# Patient Record
(demographics unavailable — no encounter records)

---

## 2024-11-01 NOTE — ASSESSMENT
[FreeTextEntry1] : 79 yo Male w/ Hx of controlled Type 2 DM, HTN, HLD, BPH, prostate cancer (s/p RT, completed 5/2023), IPMN w/ focal high-grade dysplasia (mixed main and branch duct type, mixed oncocytic, pyloric and biliary type) s/p Whipple with Delight band procedure with appendectomy for intraoperatively noted malrotation (8/11/23, negative margin, reactive LNs), followed by surgical oncology (Dr. Fan) and medical oncology (Dr. Velasquez), and multiple myeloma (Dx 2023), was referred by Dr. Velasquez for hepatitis B. He was started on entecavir by Dr. Velasquez  mid August 2024.  Will obtain:  Hepatitis B, C and D serologies, HBV VL, HIV Hepatitis A immunity Doppler of hepatic and portal veins MRI abd w/wo if no contraindication b/o abnormal enhancement on CT  AFP, CEA, CA 19-9 Will try to get records from Dr. Velasquez C/w entecavir for now. Will try to see if insurance would approve Vemlidy (after above tests).  Patient has been counseled on prevention of HBV transmission, including but not limited to: not sharing toothbrushes, razors, injection equipment, glucose testing equipment, covering open cuts and scratches, using barrier protection (if sexual partner not immune), testing household and sexual contacts and vaccinating if not immune, not donating blood, organs or sperm.  Wife reportedly negative for Hep B, but uncertain if immune. Their daughter lives in same household.

## 2024-11-01 NOTE — PHYSICAL EXAM
[Scleral Icterus] : No Scleral Icterus [Spider Angioma] : No spider angioma(s) were observed [Abdominal  Ascites] : no ascites [Non-Tender] : non-tender [Asterixis] : no asterixis observed [Jaundice] : No jaundice [Palmar Erythema] : no Palmar Erythema [FreeTextEntry4] : Limited exam due to obesity [General Appearance - Alert] : alert [General Appearance - In No Acute Distress] : in no acute distress [General Appearance - Well Nourished] : well nourished [General Appearance - Well Developed] : well developed [Sclera] : the sclera and conjunctiva were normal [Oropharynx] : the oropharynx was normal [Neck Appearance] : the appearance of the neck was normal [Auscultation Breath Sounds / Voice Sounds] : lungs were clear to auscultation bilaterally [Heart Rate And Rhythm] : heart rate was normal and rhythm regular [Bowel Sounds] : normal bowel sounds [Abdomen Soft] : soft [Abdomen Tenderness] : non-tender [] : no hepato-splenomegaly [Abdomen Mass (___ Cm)] : no abdominal mass palpated [Cervical Lymph Nodes Enlarged Posterior Bilaterally] : posterior cervical [Cervical Lymph Nodes Enlarged Anterior Bilaterally] : anterior cervical [Supraclavicular Lymph Nodes Enlarged Bilaterally] : supraclavicular [Axillary Lymph Nodes Enlarged Bilaterally] : axillary [Femoral Lymph Nodes Enlarged Bilaterally] : femoral [Inguinal Lymph Nodes Enlarged Bilaterally] : inguinal [No CVA Tenderness] : no ~M costovertebral angle tenderness [No Spinal Tenderness] : no spinal tenderness [Skin Color & Pigmentation] : normal skin color and pigmentation [FreeTextEntry1] : Grossly intact [Oriented To Time, Place, And Person] : oriented to person, place, and time [Impaired Insight] : insight and judgment were intact [Affect] : the affect was normal

## 2024-11-01 NOTE — REVIEW OF SYSTEMS
[Fever] : no fever [Chills] : no chills [Recent Weight Gain (___ Lbs)] : recent [unfilled] ~Ulb weight gain [Shortness Of Breath] : no shortness of breath [Cough] : no cough [SOB on Exertion] : no shortness of breath during exertion [Abdominal Pain] : no abdominal pain [Vomiting] : no vomiting [Constipation] : constipation [Diarrhea] : no diarrhea [Dysuria] : no dysuria [Itching] : no itching [Confused] : no confusion [Negative] : Heme/Lymph [FreeTextEntry2] : 10 lb gain last few months [FreeTextEntry7] : No nausea, occasional hematochezia (colonoscopy Dr. Eli, s/p hemorrhoid surgery Dr. Donnelly)

## 2024-11-01 NOTE — HISTORY OF PRESENT ILLNESS
[Needlestick Exposure] : no needlestick exposure [Infected Sexual Partner] : no infected sexual partner [IV Drug Use] : no IV drug use [Tattoo] : no tattoos [Body Piercing] : no body piercing [Hemodialysis] : no hemodialysis [Transfusion before 1992] : no transfusion before 1992 [Transplant before 1992] : no transplant before 1992 [Incarceration] : no incarceration [Alcohol Abuse] : no alcohol abuse [Autoimmune Disorder] : no autoimmune disorder [Household Contact to HBV] : no household contact to HBV [Travel to Endemic Area] : no travel to an endemic area [Occupational Exposure] : no occupational exposure [Cocaine Use] : no cocaine use [de-identified] : Born in [place]; Worked as technician;  [FreeTextEntry1] : 80 yo Male w/ Hx of controlled Type 2 DM, HTN, HLD, BPH, prostate cancer (s/p RT, completed 5/2023), IPMN w/ focal high-grade dysplasia (mixed main and branch duct type, mixed oncocytic, pyloric and biliary type) s/p Whipple with Guanaco band procedure with appendectomy for intraoperatively noted malrotation (8/11/23, negative margin, reactive LNs), followed by surgical oncology (Dr. Fan) and medical oncology (Dr. Velasquez), and multiple myeloma (Dx 2023), was referred by Dr. Velasquez for hepatitis B.  CT a/p w/ iv 8/9/24 showed ill-defined R hepatic lobe enhancement with wash out on venous phase without discrete mass, thought to be perfusional change, trace ascites, no recurrent pancreatic mass or metastasis, interval decrease in pancreatic duct dilation (0.8 -> 0.4 cm), dislodged pancreatic duct stent and a 1 cm renal lesion.  Of note, he also had recent COVID-19 (7/2024).   He was seen for initial evaluation 9/3/24, accompanied by wife.  No Hepatitis B labs in chart.  Per patient he was Dx with Hepatitis B at the time of his carpal tunnel surgery (4/2024), patient was not aware before. He does not have obvious risk factors for viral hepatitis. He had shoulder surgery, and fertility surgery in his young adulthood. He was started on entecavir by Dr. Velasquez  mid August 2024.  Labs 3/2024 showed: WBC 4, Hb 10.1, , Na 140, K 4.4, BUN 17, Cr 0.8, AST 21, ALT 20, ALP 70, total bili 0.2, alb 3.8, INR 0.98. Currently he is taking: Metformin 500 mg bid, Valsartan /HCTZ 320/25 mg, CArvedilol 25 mg bid, Atorvastatin 10 mg, Aspirin 81 mg, Tamsulosin 0.4 mg bid, Lupron injection (?), Eliquis 2.5 mg bid, Valacyclovir 500 mg, Velcade. Revliomid, Dexamethasone, iron, and entecavir 0.5 mg.   He is here for follow up. Labs 9/13/24 showed normal liver enzymes and function, ALT 31, past Hep B infection (HBsAg neg, HBcAb pos, HBsAb pos, HBV PCR neg), neg Hep C ab, Hep A immuity. noted mild pancytopenia (has MM).  US abd 9/23/24 showed few lineat foci that was reported as possible sign of pneumobilia, no biliary dilation, s/p cholecystectomy, and patent hepatic vasculature.  Interim he was seen by surgical oncology, Dr. Mchugh, and as per discussion, no need for MRI for now, prior imaging was reviewed, and he will do CT pancreas protocol 3/2025.

## 2024-11-01 NOTE — CONSULT LETTER
[Dear  ___] : Dear  [unfilled], [Consult Letter:] : I had the pleasure of evaluating your patient, [unfilled]. [( Thank you for referring [unfilled] for consultation for _____ )] : Thank you for referring [unfilled] for consultation for [unfilled] [Please see my note below.] : Please see my note below. [Consult Closing:] : Thank you very much for allowing me to participate in the care of this patient.  If you have any questions, please do not hesitate to contact me. [FreeTextEntry2] : Dr. Velasquez

## 2025-03-17 NOTE — ASSESSMENT
[FreeTextEntry1] : IMP: 80 year old male present with IPMN with dilated duct and nodules, high risk features- suspicious for main duct component of IPMN s/p whipple on 8/11/2023. Path showed high grade dysplasia with negative margins.   CT abd - 2/2024- TIFFANIE  CT abd- 08/2024- TIFFANIE  CT AP (NW) 03/2025-  1. No evidence of metastatic disease in the abdomen or pelvis. 2. Enlarging lesion in the superior pole the right kidney with possible internal enhancement. This lesion now measures 1.9 cm, previously 1 cm on prior from 8/9/2024. Recommend renal protocol MRI with and without contrast to evaluate for possible renal neoplasm.  PLAN:  -Referred to urology for evaluation of 1.9cm right kidney lesion - RTO in 6 months after imaging.  I have discussed the diagnosis, therapeutic plan and options with the patient at length. Patient expressed verbal understanding to proceed with proposed plan. All questions answered.

## 2025-03-17 NOTE — PHYSICAL EXAM
[FreeTextEntry1] :   COVID -19 precautions as per [facility] policy was universally followed.

## 2025-03-17 NOTE — CONSULT LETTER
Well-Child Checkup: 18 Months     Put latches on cabinet doors to help keep your child safe. At the 18-month checkup, your healthcare provider will [address] child and ask how it’s going at home. This sheet describes some of what you can expect. [Dear  ___] : Dear  [unfilled], · Your child should drink less of whole milk each day. Most calories should be from solid foods. · Besides drinking milk, water is best. Limit fruit juice. It should be 100% juice. You can also add water to the juice. And, don’t give your toddler soda.   · [Courtesy Letter:] : I had the pleasure of seeing your patient, [unfilled], in my office today. · Protect your toddler from falls with sturdy screens on windows and omrton at the tops and bottoms of staircases. Supervise the child on the stairs. · If you have a swimming pool, it should be fenced.  [place] or doors leading to the pool should be closed an [( Thank you for referring [unfilled] for consultation for _____ )] : Thank you for referring [unfilled] for consultation for [unfilled] · Your child will become more independent and more stubborn. It’s common to test limits, to see just how much he or she can get away with. You may hear the word “no” a lot— even when the child seems to mean yes! Be clear and consistent.  Keep in mind that y Next checkup at: __________24mo_____________________     PARENT NOTES:  Date Last Reviewed: 10/1/2014  © 2613-9897 [address], [address] [place] [address]. All rights reserved.  This information is not intended as a substitute f [Please see my note below.] : Please see my note below. Never give more than 4 doses in a 24 hour period  Please note the difference in the strengths between infant and children's ibuprofen  Do not give ibuprofen to children under 10months of age unless advised by your doctor    Infant Concentrated drops = 50 m [Consult Closing:] : Thank you very much for allowing me to participate in the care of this patient.  If you have any questions, please do not hesitate to contact me. - Children 6 years and older it is recommended to place consistent limits on hours per day of media use. It is important to make certain that children get enough sleep at night and exercise daily.  - Help children select appropriate media.   Talk about saf [Sincerely,] : Sincerely, [FreeTextEntry2] : Watson Newton  [FreeTextEntry3] : Vinh Fan MD, XIANG, FACS Director of Surgical Oncology- [facility] , Department of Surgery [facility] at [place] [id number] Ph: [phone] Cell: [phone]  [DrMohsen ___] : Dr. MARTEL

## 2025-03-17 NOTE — REASON FOR VISIT
[Follow-Up Visit] : a follow-up visit for [FreeTextEntry2] : IPMN with HGD on surveillance s/p whipple 08/2023

## 2025-03-17 NOTE — HISTORY OF PRESENT ILLNESS
[de-identified] : Mr. TOM MOREL is a 80 year old male presenting with IPMN s/p Whipple on 8/11/2023 here for follow up visit. Referred by Dr. Newton.  PMH: prostate cancer s/p radiation, DM2, HTN, HLD, BPH.  Family history: Denies   CT 12/2022: 4.8 x 5.5 cm mass in the head.   EGD/EUS 5/24/23: 5.8 cm pancreatic head cystic lesion with mild upstream ductal dilation, possible IPMN vs serous cystadenoma.  FNA- negative for malignant cells.   CT abd/pelvis 6/19/23: Multiseptated cystic lesion in the pancreatic head measuring 6.7 cm; differential includes an intraductal papillary mucinous neoplasm or serous cystadenoma; further evaluation is recommended with a pre and post IV contrast MRI of the abdomen.Dilated main pancreatic duct upstream from the mass measuring 5 mm.1.1 cm lesion in the upper pole the right kidney, incompletely characterized on this study; further evaluation is also recommended with an MRI  7/26/2023: Completed treatment for prostate cancer 2 months ago. Stress test (2 weeks ago) positive and was recommended cardiac cath.   **surgery** s/p whipple on 8/11/2023.  1. Gallbladder: Mild chronic cholecystitis. 2. Product of Whipple: Intraductal papillary mucinous neoplasm (IPMN) with focal high-grade dysplasia, mixed main and branch duct types, mixed oncocytic, pyloric, and biliary type epithelium. Low-grade PanIN. Margins of resection are negative for tumor. Fourteen reactive lymph nodes, including occasional epithelioid granulomas, non-necrotizing. 3. Gaunaco's band: Benign fibromembranous tissue and adipose tissue. 4. Appendix: benign appendix with fibrous obliteration. AFB and GMS stains negative  8/22/2023 C DIFF negative  8/23/2023: Postop, patient states diarrhea has resolved and now has soft stools after using Imodium. FAM serosanguinous output ranging from  mLs. Tolerating diet.   8/29/2023: CDIFF negative  9/27/2023: Today patient is doing well, he is complaining of some gas pain. He's been taking Gas-X which provides some relief. He was started on iron PO for anemia. Complaining of some constipation, taking miralax prn. Otherwise, denies nausea, vomiting, fevers/chills.   3/20/24: Patient reports feeling well today. Denies fever, chills, N/V/D, unintentional weight loss.  CT 2/2024- TIFFANIE  CT 08/2024- TIFFANIE   Cardiologist: Dr. Korey Forbes  [phone] PCP: Dr. Eric Goldberg  Urology: Dr. Castillo Emery  GI: Dr: Markus Mujica/onc: Dr. Velasquez

## 2025-04-01 NOTE — HISTORY OF PRESENT ILLNESS
[de-identified] : Mr. TOM MOREL is a 80 year old male presenting with IPMN s/p Whipple on 8/11/2023 here for follow up visit. Referred by Dr. Newton.  PMH: prostate cancer s/p radiation, DM2, HTN, HLD, BPH.  Family history: Denies   CT 12/2022: 4.8 x 5.5 cm mass in the head.   EGD/EUS 5/24/23: 5.8 cm pancreatic head cystic lesion with mild upstream ductal dilation, possible IPMN vs serous cystadenoma.  FNA- negative for malignant cells.   CT abd/pelvis 6/19/23: Multiseptated cystic lesion in the pancreatic head measuring 6.7 cm; differential includes an intraductal papillary mucinous neoplasm or serous cystadenoma; further evaluation is recommended with a pre and post IV contrast MRI of the abdomen.Dilated main pancreatic duct upstream from the mass measuring 5 mm.1.1 cm lesion in the upper pole the right kidney, incompletely characterized on this study; further evaluation is also recommended with an MRI  7/26/2023: Completed treatment for prostate cancer 2 months ago. Stress test (2 weeks ago) positive and was recommended cardiac cath.   **surgery** s/p whipple on 8/11/2023.  1. Gallbladder: Mild chronic cholecystitis. 2. Product of Whipple: Intraductal papillary mucinous neoplasm (IPMN) with focal high-grade dysplasia, mixed main and branch duct types, mixed oncocytic, pyloric, and biliary type epithelium. Low-grade PanIN. Margins of resection are negative for tumor. Fourteen reactive lymph nodes, including occasional epithelioid granulomas, non-necrotizing. 3. Guanaco's band: Benign fibromembranous tissue and adipose tissue. 4. Appendix: benign appendix with fibrous obliteration. AFB and GMS stains negative  8/22/2023 C DIFF negative  8/23/2023: Postop, patient states diarrhea has resolved and now has soft stools after using Imodium. FAM serosanguinous output ranging from  mLs. Tolerating diet.   8/29/2023: CDIFF negative  9/27/2023: Today patient is doing well, he is complaining of some gas pain. He's been taking Gas-X which provides some relief. He was started on iron PO for anemia. Complaining of some constipation, taking miralax prn. Otherwise, denies nausea, vomiting, fevers/chills.   3/20/24: Patient reports feeling well today. Denies fever, chills, N/V/D, unintentional weight loss.  CT 2/2024- TIFFANIE  CT 08/2024- TIFFANIE  03/26/2025 Pt reports to the office today feeling well. Denies abdominal pain, n/v, diarrhea, blood in the stool, tolerating diet, and denies weight loss in the last 2 months. No generalized jaundice noted or acholic stools. Hypertensive, denies headaches, but can feel some pressure, pt advised to follow up with PCP Dr. Goldberg.   Cardiologist: Dr. Korey Forbes  [phone] PCP: Dr. Eric Goldberg  Urology: Dr. Castillo Emery  GI: Dr: Markus Mujica/onc: Dr. Velasquez

## 2025-04-01 NOTE — CONSULT LETTER
[DrMohsen  ___] : Dr. MARTEL [FreeTextEntry2] : Castillo Emery [FreeTextEntry3] : Vinh Fan MD, XIANG, FACS Director of Surgical Oncology- [facility] , Department of Surgery [facility] at [place] [id number] Ph: [phone] Cell: [phone]

## 2025-04-01 NOTE — PHYSICAL EXAM
[Normal] : supple, no neck mass and thyroid not enlarged [Normal Neck Lymph Nodes] : normal neck lymph nodes  [Normal Supraclavicular Lymph Nodes] : normal supraclavicular lymph nodes [Normal Groin Lymph Nodes] : normal groin lymph nodes [Normal Axillary Lymph Nodes] : normal axillary lymph nodes [Normal] : oriented to person, place and time, with appropriate affect [FreeTextEntry1] :   COVID -19 precautions as per [facility] policy was universally followed. [de-identified] : Abdomen soft, nontender, nondistended, no palpable masses.

## 2025-04-01 NOTE — CONSULT LETTER
[DrMohsen  ___] : Dr. MARTEL [FreeTextEntry2] : Castillo Emery [FreeTextEntry3] : Vinh Fan MD, XIANG, FACS Director of Surgical Oncology- [facility] , Department of Surgery [facility] at [place] [id number] Ph: [phone] Cell: [phone]

## 2025-04-01 NOTE — HISTORY OF PRESENT ILLNESS
[de-identified] : Mr. TOM MOREL is a 80 year old male presenting with IPMN s/p Whipple on 8/11/2023 here for follow up visit. Referred by Dr. Newton.  PMH: prostate cancer s/p radiation, DM2, HTN, HLD, BPH.  Family history: Denies   CT 12/2022: 4.8 x 5.5 cm mass in the head.   EGD/EUS 5/24/23: 5.8 cm pancreatic head cystic lesion with mild upstream ductal dilation, possible IPMN vs serous cystadenoma.  FNA- negative for malignant cells.   CT abd/pelvis 6/19/23: Multiseptated cystic lesion in the pancreatic head measuring 6.7 cm; differential includes an intraductal papillary mucinous neoplasm or serous cystadenoma; further evaluation is recommended with a pre and post IV contrast MRI of the abdomen.Dilated main pancreatic duct upstream from the mass measuring 5 mm.1.1 cm lesion in the upper pole the right kidney, incompletely characterized on this study; further evaluation is also recommended with an MRI  7/26/2023: Completed treatment for prostate cancer 2 months ago. Stress test (2 weeks ago) positive and was recommended cardiac cath.   **surgery** s/p whipple on 8/11/2023.  1. Gallbladder: Mild chronic cholecystitis. 2. Product of Whipple: Intraductal papillary mucinous neoplasm (IPMN) with focal high-grade dysplasia, mixed main and branch duct types, mixed oncocytic, pyloric, and biliary type epithelium. Low-grade PanIN. Margins of resection are negative for tumor. Fourteen reactive lymph nodes, including occasional epithelioid granulomas, non-necrotizing. 3. Guanaco's band: Benign fibromembranous tissue and adipose tissue. 4. Appendix: benign appendix with fibrous obliteration. AFB and GMS stains negative  8/22/2023 C DIFF negative  8/23/2023: Postop, patient states diarrhea has resolved and now has soft stools after using Imodium. FAM serosanguinous output ranging from  mLs. Tolerating diet.   8/29/2023: CDIFF negative  9/27/2023: Today patient is doing well, he is complaining of some gas pain. He's been taking Gas-X which provides some relief. He was started on iron PO for anemia. Complaining of some constipation, taking miralax prn. Otherwise, denies nausea, vomiting, fevers/chills.   3/20/24: Patient reports feeling well today. Denies fever, chills, N/V/D, unintentional weight loss.  CT 2/2024- TIFFANIE  CT 08/2024- TIFFANIE  03/26/2025 Pt reports to the office today feeling well. Denies abdominal pain, n/v, diarrhea, blood in the stool, tolerating diet, and denies weight loss in the last 2 months. No generalized jaundice noted or acholic stools. Hypertensive, denies headaches, but can feel some pressure, pt advised to follow up with PCP Dr. Goldberg.   Cardiologist: Dr. Korey Forbes  [phone] PCP: Dr. Eric Goldberg  Urology: Dr. Castillo Emery  GI: Dr: Markus Mujica/onc: Dr. Velasquez

## 2025-04-01 NOTE — CONSULT LETTER
[DrMohsen  ___] : Dr. MARTEL [FreeTextEntry2] : Castillo Emery [FreeTextEntry3] : Vinh Fan MD, XIANG, FACS Director of Surgical Oncology- [facility] , Department of Surgery [facility] at [place] [id number] Ph: [phone] Cell: [phone]

## 2025-04-01 NOTE — PHYSICAL EXAM
[Normal] : supple, no neck mass and thyroid not enlarged [Normal Neck Lymph Nodes] : normal neck lymph nodes  [Normal Supraclavicular Lymph Nodes] : normal supraclavicular lymph nodes [Normal Groin Lymph Nodes] : normal groin lymph nodes [Normal Axillary Lymph Nodes] : normal axillary lymph nodes [Normal] : oriented to person, place and time, with appropriate affect [FreeTextEntry1] :   COVID -19 precautions as per [facility] policy was universally followed. [de-identified] : Abdomen soft, nontender, nondistended, no palpable masses.

## 2025-04-01 NOTE — ASSESSMENT
[FreeTextEntry1] : IMP: 80 year old male present with IPMN with dilated duct and nodules, high risk features- suspicious for main duct component of IPMN s/p whipple on 8/11/2023. Path showed high grade dysplasia with negative margins.   CT abd - 2/2024- TIFFANIE  CT abd- 08/2024- TIFFANIE  CT AP (NW) 03/2025-  1. No evidence of metastatic disease in the abdomen or pelvis. 2. Enlarging lesion in the superior pole the right kidney with possible internal enhancement. This lesion now measures 1.9 cm, previously 1 cm on prior from 8/9/2024. Recommend renal protocol MRI with and without contrast to evaluate for possible renal neoplasm.  PLAN:  -Referred to urology for evaluation of 1.9cm right kidney lesion, referred to urologist  - RTO in 6 months after imaging.  I have discussed the diagnosis, therapeutic plan and options with the patient at length. Patient expressed verbal understanding to proceed with proposed plan. All questions answered.

## 2025-04-01 NOTE — HISTORY OF PRESENT ILLNESS
[de-identified] : Mr. TOM MOREL is a 80 year old male presenting with IPMN s/p Whipple on 8/11/2023 here for follow up visit. Referred by Dr. Newton.  PMH: prostate cancer s/p radiation, DM2, HTN, HLD, BPH.  Family history: Denies   CT 12/2022: 4.8 x 5.5 cm mass in the head.   EGD/EUS 5/24/23: 5.8 cm pancreatic head cystic lesion with mild upstream ductal dilation, possible IPMN vs serous cystadenoma.  FNA- negative for malignant cells.   CT abd/pelvis 6/19/23: Multiseptated cystic lesion in the pancreatic head measuring 6.7 cm; differential includes an intraductal papillary mucinous neoplasm or serous cystadenoma; further evaluation is recommended with a pre and post IV contrast MRI of the abdomen.Dilated main pancreatic duct upstream from the mass measuring 5 mm.1.1 cm lesion in the upper pole the right kidney, incompletely characterized on this study; further evaluation is also recommended with an MRI  7/26/2023: Completed treatment for prostate cancer 2 months ago. Stress test (2 weeks ago) positive and was recommended cardiac cath.   **surgery** s/p whipple on 8/11/2023.  1. Gallbladder: Mild chronic cholecystitis. 2. Product of Whipple: Intraductal papillary mucinous neoplasm (IPMN) with focal high-grade dysplasia, mixed main and branch duct types, mixed oncocytic, pyloric, and biliary type epithelium. Low-grade PanIN. Margins of resection are negative for tumor. Fourteen reactive lymph nodes, including occasional epithelioid granulomas, non-necrotizing. 3. Guanaco's band: Benign fibromembranous tissue and adipose tissue. 4. Appendix: benign appendix with fibrous obliteration. AFB and GMS stains negative  8/22/2023 C DIFF negative  8/23/2023: Postop, patient states diarrhea has resolved and now has soft stools after using Imodium. FAM serosanguinous output ranging from  mLs. Tolerating diet.   8/29/2023: CDIFF negative  9/27/2023: Today patient is doing well, he is complaining of some gas pain. He's been taking Gas-X which provides some relief. He was started on iron PO for anemia. Complaining of some constipation, taking miralax prn. Otherwise, denies nausea, vomiting, fevers/chills.   3/20/24: Patient reports feeling well today. Denies fever, chills, N/V/D, unintentional weight loss.  CT 2/2024- TIFFANIE  CT 08/2024- TIFFANIE  03/26/2025 Pt reports to the office today feeling well. Denies abdominal pain, n/v, diarrhea, blood in the stool, tolerating diet, and denies weight loss in the last 2 months. No generalized jaundice noted or acholic stools. Hypertensive, denies headaches, but can feel some pressure, pt advised to follow up with PCP Dr. Goldberg.   Cardiologist: Dr. Korey Forbes  [phone] PCP: Dr. Eric Goldberg  Urology: Dr. Castillo Emery  GI: Dr: Markus Mujica/onc: Dr. Velasquez

## 2025-04-01 NOTE — PHYSICAL EXAM
[Normal] : supple, no neck mass and thyroid not enlarged [Normal Neck Lymph Nodes] : normal neck lymph nodes  [Normal Supraclavicular Lymph Nodes] : normal supraclavicular lymph nodes [Normal Groin Lymph Nodes] : normal groin lymph nodes [Normal Axillary Lymph Nodes] : normal axillary lymph nodes [Normal] : oriented to person, place and time, with appropriate affect [FreeTextEntry1] :   COVID -19 precautions as per [facility] policy was universally followed. [de-identified] : Abdomen soft, nontender, nondistended, no palpable masses.

## 2025-05-07 NOTE — PHYSICAL EXAM
[Non-Tender] : non-tender [General Appearance - Alert] : alert [General Appearance - In No Acute Distress] : in no acute distress [General Appearance - Well Nourished] : well nourished [General Appearance - Well Developed] : well developed [Sclera] : the sclera and conjunctiva were normal [Oropharynx] : the oropharynx was normal [Neck Appearance] : the appearance of the neck was normal [Auscultation Breath Sounds / Voice Sounds] : lungs were clear to auscultation bilaterally [Heart Rate And Rhythm] : heart rate was normal and rhythm regular [Bowel Sounds] : normal bowel sounds [Abdomen Soft] : soft [Abdomen Tenderness] : non-tender [] : no hepato-splenomegaly [Abdomen Mass (___ Cm)] : no abdominal mass palpated [Cervical Lymph Nodes Enlarged Posterior Bilaterally] : posterior cervical [Cervical Lymph Nodes Enlarged Anterior Bilaterally] : anterior cervical [Supraclavicular Lymph Nodes Enlarged Bilaterally] : supraclavicular [Axillary Lymph Nodes Enlarged Bilaterally] : axillary [Femoral Lymph Nodes Enlarged Bilaterally] : femoral [Inguinal Lymph Nodes Enlarged Bilaterally] : inguinal [No CVA Tenderness] : no ~M costovertebral angle tenderness [No Spinal Tenderness] : no spinal tenderness [Skin Color & Pigmentation] : normal skin color and pigmentation [Oriented To Time, Place, And Person] : oriented to person, place, and time [Impaired Insight] : insight and judgment were intact [Affect] : the affect was normal [Scleral Icterus] : No Scleral Icterus [Spider Angioma] : No spider angioma(s) were observed [Abdominal  Ascites] : no ascites [Asterixis] : no asterixis observed [Jaundice] : No jaundice [Palmar Erythema] : no Palmar Erythema [FreeTextEntry4] : Limited exam due to obesity [FreeTextEntry1] : Grossly intact

## 2025-05-07 NOTE — REVIEW OF SYSTEMS
[Recent Weight Gain (___ Lbs)] : recent [unfilled] ~Ulb weight gain [Constipation] : constipation [Negative] : Heme/Lymph [Fever] : no fever [Chills] : no chills [Shortness Of Breath] : no shortness of breath [Cough] : no cough [SOB on Exertion] : no shortness of breath during exertion [Abdominal Pain] : no abdominal pain [Vomiting] : no vomiting [Diarrhea] : no diarrhea [Dysuria] : no dysuria [Itching] : no itching [Confused] : no confusion [FreeTextEntry2] : 10 lb gain last few months [FreeTextEntry7] : No nausea, occasional hematochezia (colonoscopy Dr. Eli, s/p hemorrhoid surgery Dr. Donnelly)

## 2025-05-07 NOTE — HISTORY OF PRESENT ILLNESS
[Needlestick Exposure] : no needlestick exposure [Infected Sexual Partner] : no infected sexual partner [IV Drug Use] : no IV drug use [Tattoo] : no tattoos [Body Piercing] : no body piercing [Hemodialysis] : no hemodialysis [Transfusion before 1992] : no transfusion before 1992 [Transplant before 1992] : no transplant before 1992 [Incarceration] : no incarceration [Alcohol Abuse] : no alcohol abuse [Autoimmune Disorder] : no autoimmune disorder [Household Contact to HBV] : no household contact to HBV [Travel to Endemic Area] : no travel to an endemic area [Occupational Exposure] : no occupational exposure [Cocaine Use] : no cocaine use [de-identified] : Born in [place]; Worked as technician;  [FreeTextEntry1] : 82 yo Male w/ Hx of controlled Type 2 DM, HTN, HLD, BPH, prostate cancer (s/p RT, completed 5/2023), IPMN w/ focal high-grade dysplasia (mixed main and branch duct type, mixed oncocytic, pyloric and biliary type) s/p Whipple with [place] band procedure with appendectomy for intraoperatively noted malrotation (8/11/23, negative margin, reactive LNs), followed by surgical oncology (Dr. Fan) and medical oncology (Dr. Velasquez), and multiple myeloma (Dx 2023), was referred by Dr. Velasquez for hepatitis B.  CT a/p w/ iv 8/9/24 showed ill-defined R hepatic lobe enhancement with wash out on venous phase without discrete mass, thought to be perfusional change, trace ascites, no recurrent pancreatic mass or metastasis, interval decrease in pancreatic duct dilation (0.8 -> 0.4 cm), dislodged pancreatic duct stent and a 1 cm renal lesion.  Of note, he also had recent COVID-19 (7/2024).   He was seen for initial evaluation 9/3/24, accompanied by wife.  No Hepatitis B labs in chart.  Per patient he was Dx with Hepatitis B at the time of his carpal tunnel surgery (4/2024), patient was not aware before. He does not have obvious risk factors for viral hepatitis. He had shoulder surgery, and fertility surgery in his young adulthood. He was started on entecavir by Dr. Velasquez  mid August 2024.  Labs 3/2024 showed: WBC 4, Hb 10.1, , Na 140, K 4.4, BUN 17, Cr 0.8, AST 21, ALT 20, ALP 70, total bili 0.2, alb 3.8, INR 0.98. Currently he is taking: Metformin 500 mg bid, Valsartan /HCTZ 320/25 mg, CArvedilol 25 mg bid, Atorvastatin 10 mg, Aspirin 81 mg, Tamsulosin 0.4 mg bid, Lupron injection (?), Eliquis 2.5 mg bid, Valacyclovir 500 mg, Velcade. Revliomid, Dexamethasone, iron, and entecavir 0.5 mg.   He was here for follow up on 11/1/24 Labs 9/13/24 showed normal liver enzymes and function, ALT 31, past Hep B infection (HBsAg neg, HBcAb pos, HBsAb pos, HBV PCR neg), neg Hep C ab, Hep A immuity. noted mild pancytopenia (has MM).  US abd 9/23/24 showed few lineat foci that was reported as possible sign of pneumobilia, no biliary dilation, s/p cholecystectomy, and patent hepatic vasculature.  Interim he was seen by surgical oncology, Dr. Mchugh, and as per discussion, no need for MRI for now, prior imaging was reviewed, and he will do CT pancreas protocol 3/2025.  He is here for follow up on 5/2/25 Patient reports feeling well, denies all acute complaints.  Patient reports no longer taking chemo for MM due to the treatment not helping.  Patient now taking Velcade.  Patient reports he has been gaining weight since the Whipple on 8/23.  Patient states Dr. Fan is no longer following patient. Patient had repeat CT A/P with IV contrast in march 2025 showing no evidence of met disease however enlarging lesion in the right kidney, now 1.9cm up from 1cm.  Patient states he has had the repeat MRI for this kidney and has followed up with his urologist who is monitoring but does not require intervention.  Patients urologist PA is Sanjana Villeda [facility] [phone] [id number], [address] [facility] [address].  Patient has also started accupunture for help with his BPH per his urolgist.  Patient had CBC preformed on 3/31/25 showing Hb 11.4, plt 148, wbc 4.7.

## 2025-05-07 NOTE — ASSESSMENT
[FreeTextEntry1] : 82 yo Male w/ Hx of controlled Type 2 DM, HTN, HLD, BPH, prostate cancer (s/p RT, completed 5/2023), IPMN w/ focal high-grade dysplasia (mixed main and branch duct type, mixed oncocytic, pyloric and biliary type) s/p Whipple with [place] band procedure with appendectomy for intraoperatively noted malrotation (8/11/23, negative margin, reactive LNs), followed by surgical oncology (Dr. Fan) and medical oncology (Dr. Velasquez), and multiple myeloma (Dx 2023), was referred by Dr. Velasquez for hepatitis B. He was started on entecavir by Dr. Velasquez  mid August 2024.  Reviewed labs  Patient now taking Vemlidy  Patient has been counseled on prevention of HBV transmission, including but not limited to: not sharing toothbrushes, razors, injection equipment, glucose testing equipment, covering open cuts and scratches, using barrier protection (if sexual partner not immune), testing household and sexual contacts and vaccinating if not immune, not donating blood, organs or sperm.  Wife reportedly negative for Hep B, but uncertain if immune. Their daughter lives in same household.